# Patient Record
Sex: MALE | URBAN - METROPOLITAN AREA
[De-identification: names, ages, dates, MRNs, and addresses within clinical notes are randomized per-mention and may not be internally consistent; named-entity substitution may affect disease eponyms.]

---

## 2019-11-14 ENCOUNTER — APPOINTMENT (RX ONLY)
Dept: URBAN - METROPOLITAN AREA CLINIC 39 | Facility: CLINIC | Age: 49
Setting detail: DERMATOLOGY
End: 2019-11-14

## 2019-11-14 DIAGNOSIS — L91.8 OTHER HYPERTROPHIC DISORDERS OF THE SKIN: ICD-10-CM

## 2019-11-14 DIAGNOSIS — L82.1 OTHER SEBORRHEIC KERATOSIS: ICD-10-CM

## 2019-11-14 DIAGNOSIS — D22 MELANOCYTIC NEVI: ICD-10-CM

## 2019-11-14 PROBLEM — D22.5 MELANOCYTIC NEVI OF TRUNK: Status: ACTIVE | Noted: 2019-11-14

## 2019-11-14 PROBLEM — E13.9 OTHER SPECIFIED DIABETES MELLITUS WITHOUT COMPLICATIONS: Status: ACTIVE | Noted: 2019-11-14

## 2019-11-14 PROBLEM — D22.62 MELANOCYTIC NEVI OF LEFT UPPER LIMB, INCLUDING SHOULDER: Status: ACTIVE | Noted: 2019-11-14

## 2019-11-14 PROBLEM — D22.61 MELANOCYTIC NEVI OF RIGHT UPPER LIMB, INCLUDING SHOULDER: Status: ACTIVE | Noted: 2019-11-14

## 2019-11-14 PROBLEM — I10 ESSENTIAL (PRIMARY) HYPERTENSION: Status: ACTIVE | Noted: 2019-11-14

## 2019-11-14 PROCEDURE — 99202 OFFICE O/P NEW SF 15 MIN: CPT

## 2019-11-14 PROCEDURE — ? COUNSELING

## 2019-11-14 ASSESSMENT — LOCATION ZONE DERM
LOCATION ZONE: TRUNK
LOCATION ZONE: NECK
LOCATION ZONE: ARM
LOCATION ZONE: AXILLAE

## 2019-11-14 ASSESSMENT — LOCATION SIMPLE DESCRIPTION DERM
LOCATION SIMPLE: CHEST
LOCATION SIMPLE: LEFT UPPER ARM
LOCATION SIMPLE: LEFT AXILLARY VAULT
LOCATION SIMPLE: RIGHT UPPER ARM
LOCATION SIMPLE: LEFT UPPER BACK
LOCATION SIMPLE: LEFT ANTERIOR NECK
LOCATION SIMPLE: RIGHT AXILLARY VAULT
LOCATION SIMPLE: RIGHT ANTERIOR NECK

## 2019-11-14 ASSESSMENT — LOCATION DETAILED DESCRIPTION DERM
LOCATION DETAILED: LEFT PROXIMAL POSTERIOR UPPER ARM
LOCATION DETAILED: LEFT AXILLARY VAULT
LOCATION DETAILED: LEFT LATERAL SUPERIOR CHEST
LOCATION DETAILED: RIGHT INFERIOR LATERAL NECK
LOCATION DETAILED: LEFT DISTAL POSTERIOR UPPER ARM
LOCATION DETAILED: RIGHT PROXIMAL POSTERIOR UPPER ARM
LOCATION DETAILED: LEFT INFERIOR UPPER BACK
LOCATION DETAILED: RIGHT AXILLARY VAULT
LOCATION DETAILED: LEFT INFERIOR LATERAL NECK
LOCATION DETAILED: LEFT MID-UPPER BACK

## 2019-11-14 ASSESSMENT — PAIN INTENSITY VAS: HOW INTENSE IS YOUR PAIN 0 BEING NO PAIN, 10 BEING THE MOST SEVERE PAIN POSSIBLE?: NO PAIN

## 2019-11-14 NOTE — PROCEDURE: COUNSELING
Patient Specific Counseling (Will Not Stick From Patient To Patient): Patient has 150+ tags with many 3mm stalk diameter. At least est. 49 will need lidocaine at the base. Cosmetic price quoted $600.00 (4 areas x $150 each). Will need at least an hour for removal. If patient desires, cosmetic removal can be done on 2 different appointments, 1 for neck, 2nd for underarms. Needs to be scheduled for a procedure day only.
Detail Level: Zone

## 2019-12-23 ENCOUNTER — HOSPITAL ENCOUNTER (OUTPATIENT)
Dept: HOSPITAL 61 - PCVCIMAG | Age: 49
Discharge: HOME | End: 2019-12-23
Attending: INTERNAL MEDICINE
Payer: COMMERCIAL

## 2019-12-23 DIAGNOSIS — I25.10: Primary | ICD-10-CM

## 2019-12-23 DIAGNOSIS — R06.00: ICD-10-CM

## 2019-12-23 DIAGNOSIS — R93.1: ICD-10-CM

## 2019-12-23 PROCEDURE — 78452 HT MUSCLE IMAGE SPECT MULT: CPT

## 2019-12-23 PROCEDURE — A9500 TC99M SESTAMIBI: HCPCS

## 2019-12-23 PROCEDURE — 93306 TTE W/DOPPLER COMPLETE: CPT

## 2019-12-23 PROCEDURE — 93017 CV STRESS TEST TRACING ONLY: CPT

## 2019-12-24 NOTE — PCVCIMAG
--------------- APPROVED REPORT --------------





Study performed:  2019 13:27:55



EXAM: Comprehensive 2D, Doppler, and color-flow 

Echocardiogram

Patient Location: Echo lab

Room #:  2Status:  routine



BSA:         2.37

HR: 76 bpm

Rhythm: NSR



Other Information 

Study Quality: Fair



Indications

CAD

Hypertension/HDD

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



2D Dimensions

IVSd:  8.33 (7-11mm)LVOT Diam:  24.17 (18-24mm) 

LVDd:  46.37 mm

PWd:  9.11 (7-11mm)

LVDs:  25.07 (25-40mm)

Left Atrium:  34.00 (27-40mm)

Aortic Root:  31.15 mm

LV Single Plane 4CH:  59.00 %

LV Single Plane 2CH:  58.34 %

Biplane EF:  57.9 %



Volumes

Left Atrial Volume (Systole)

Single Plane 4CH:  61.44 mLSingle Plane 2CH:  65.03 mL

Biplane LA Volume:  65.00 mLLA ESV Index:  27.00 mL/m2



Aortic Valve

AoV Peak Nura.:  1.33 m/s

AO Peak Gr.:  7.05 mmHgLVOT Max P.24 mmHg

LVOT Max V:  0.97 m/s

JUANCARLOS Vmax: 3.33 cm2



Mitral Valve

E/A Ratio:  1.5

MV Decel. Time:  147.61 ms

MV E Max Nura.:  0.78 m/s

MV A Nura.:  0.51 m/s

IVRT:  89.97 ms



TDI

E/Lateral E':  6.50E/Medial E':  7.80

Medial E' Nura.:  0.10 m/s

Lateral E' Nura.:  0.12 m/s



Pulmonary Valve

PV Peak Nura.:  0.89 m/sPV Peak Gr.:  3.18 mmHg



Pulmonary Vein

P Vein S:    0.50 m/sP Vein A:  0.24 m/s

P Vein D:   0.38 m/sP Vein A Dur.:  100.3 msec

P Vein S/D Ratio:  1.32



Tricuspid Valve

TR Peak Nura.:  2.13 m/s

TR Peak Gr.:  18.12 mmHg

TV Vmax:  0.63 m/sPA Pressure:  25.00 mmHg



Left Ventricle

The left ventricle is normal size. There is normal LV segmental wall 

motion. There is normal left ventricular wall thickness. Left 

ventricular systolic function is normal. The left ventricular 

ejection fraction is within the normal range. LVEF is 55-60%. The 

left ventricular diastolic function is normal.



Right Ventricle

The right ventricle is normal size. The right ventricular systolic 

function is normal.



Atria

The left atrium size is normal. The right atrium size is 

normal.



Aortic Valve

Aortic valve is trileaflet. The aortic valve is normal in structure 

and function. No aortic regurgitation is present. There is no aortic 

valvular stenosis.



Mitral Valve

The mitral valve is normal in structure. There is no mitral valve 

regurgitation noted. No evidence of mitral valve stenosis.



Tricuspid Valve

The tricuspid valve is normal in structure. Trace tricuspid 

regurgitation. No pulmonary hypertension.



Pulmonic Valve

The pulmonary valve is normal in structure. There is no pulmonic 

valvular regurgitation.



Great Vessels

The aortic root is normal in size. The ascending aorta is normal in 

size. IVC is normal in size and collapses >50% with 

inspiration.



Pericardium

There is no pericardial effusion. There is no pleural 

effusion.



<Conclusion>

The left ventricle is normal size.

LVEF is 55-60%.

Aortic valve is trileaflet.

The aortic valve is normal in structure and function.

The mitral valve is normal in structure.

The tricuspid valve is normal in structure.

Trace tricuspid regurgitation.

No pulmonary hypertension.

The pulmonary valve is normal in structure.

There is no pericardial effusion. Normal rate, regular rhythm.  Heart sounds S1, S2.  No murmurs, rubs or gallops.

## 2019-12-26 NOTE — PCVCIMAG
--------------- APPROVED REPORT --------------





Imaging Protocol: Rest Tc-99m/Stress Tc-99m 1 day

Study performed:  12/23/2019 14:12:12



Indication: Dyspnea, CAD, Dizzy

Patient Location: Out-Patient

Stress Nurse: Johanna Colindres RN, Samanta Al RN

NM Tech:Mellisa [facility]



Ht: 5 ft 11 in Wt: 277 lbs BSA:  2.42 m2

HR: 68 bpm                      BP: 129/82 mmHg         BMI:  

38.62

Rhythm:  Normal Sinus Rhythm



Medical History

Medical History: Hyperlipidemia, HTN, Diabetes

Medications: Lisinopril - HCTZ

Allergies: Metformin, Niaspan

Cardiac Risk Factors: Age

Pretest Chest Pain Characteristics: No chest pain

Exercise History: Physically active



Resting Data

Rest SPECT myocardial perfusion imaging was performed in supine 

position 45 minutes following the intravenous injection of 12.6 mCi 

of Tc-99m Sestamibi.

Time of rest injection: 1315     

Administration Route: IV

Administration Site: Right AC



Pharmacologic Stress

Pharmacologic stress test was performed by injecting Regadenoson 0.4 

mg IV push over 10-15 seconds immediately followed by the intravenous 

injection of 41.5 mCi of Tc-99m Sestamibi.

Time of stress injection: 1445     Date: 12/23/2019

Administration Route: IV

Administration Site: Right AC

Gated Stress SPECT was performed 45 minutes after stress 

injection.

The images were gated to evaluate regional wall motion and calculate 

left ventricular ejection fraction. 



Stress Test Details

Stress Test:  Pharmacologic stress testing performed using 0.4 mg of 

regadenoson per 5 mL given IV over 10 seconds.

  Reason for pharmacologic stress test: physical limitation, 

dizziness.



HRMax Heart Rate (APMHR): 170 bpm 

Resting HR:            68 bpmTarget HR (85% APMHR): 144 bpm

Max HR Achieved:  109 bpm

% of APMHR:         64

Recovery HR:            87 bpm



BP

Resting BP:  129/82 mmHg

Max BP:       124/83 mmHg

Recovery BP:       138/80 mmHg

ECG

Resting ECG:  Normal Sinus Rhythm

Stress ECG:     Sinus Tachycardia

Arrhythmia:    None

Recovery ECG: Sinus Rhythm



Clinical

Reason for Termination: Completed protocol

Stress Symptoms: Dyspnea, Dizziness, Leg heaviness, 

Diaphoresis

Symptoms resolved with caffeine.



Stress ECG Conclusion

1. Adequate response intravenous Lexiscan

2. Hematocrit heart rate for ECG diagnosis



Study Data

Post stress, the left ventricular ejection was 72%..

SSS: 1

SRS: 1

SDS: 0

TID = 1.20.



Perfusion

There is a medium area of moderately reduced uptake in the mid and 

apical segment of the inferior wall which is seen on the stress 

images as well as the resting images.

This area thickens and moves normally and is most consistent with 

attenuation artifact.



Wall Motion

Normal left ventricular wall motion.



Nuclear Conclusion

ECG Findings: non-diagnostic 

Clinical Findings: negative for ischemia 

Nuclear Findings: negative for ischemia 

Exercise Capacity: not assessed

Left Ventricular Function: normal 

1. Low risk study without evidence of inducible ischemia although 

diffuse patchy uptake may represent three-vessel coronary disease or 

body habitus artifact. Clinical correlation suggested

2. Post exercise left ventricle ejection fraction 72% with normal 

contractility 



<Conclusion>

1. Adequate response intravenous Lexiscan

2. Hematocrit heart rate for ECG diagnosis

## 2023-11-02 NOTE — HPI: SKIN LESION (SKIN TAGS)
Start a new medication called amlodipine 2.5 mg daily. This will help with blood pressure although we may ultimately need to increase the dose. If you have coronary vasospasm (we are not sure if this is the case or not) this would also be a medication to treat that.
Is This A New Presentation, Or A Follow-Up?: Skin Lesions